# Patient Record
Sex: FEMALE | Race: WHITE | NOT HISPANIC OR LATINO
[De-identification: names, ages, dates, MRNs, and addresses within clinical notes are randomized per-mention and may not be internally consistent; named-entity substitution may affect disease eponyms.]

---

## 2020-06-17 ENCOUNTER — APPOINTMENT (OUTPATIENT)
Dept: RADIOLOGY | Facility: CLINIC | Age: 45
End: 2020-06-17

## 2020-06-17 ENCOUNTER — OUTPATIENT (OUTPATIENT)
Dept: OUTPATIENT SERVICES | Facility: HOSPITAL | Age: 45
LOS: 1 days | End: 2020-06-17
Payer: MEDICAID

## 2020-06-17 ENCOUNTER — RESULT REVIEW (OUTPATIENT)
Age: 45
End: 2020-06-17

## 2020-06-17 ENCOUNTER — APPOINTMENT (OUTPATIENT)
Dept: ORTHOPEDIC SURGERY | Facility: CLINIC | Age: 45
End: 2020-06-17
Payer: MEDICAID

## 2020-06-17 ENCOUNTER — APPOINTMENT (OUTPATIENT)
Dept: INTERNAL MEDICINE | Facility: CLINIC | Age: 45
End: 2020-06-17
Payer: MEDICAID

## 2020-06-17 VITALS — WEIGHT: 56 LBS | BODY MASS INDEX: 10.3 KG/M2 | HEIGHT: 62 IN

## 2020-06-17 VITALS
HEART RATE: 65 BPM | TEMPERATURE: 97.4 F | BODY MASS INDEX: 10.3 KG/M2 | HEIGHT: 62 IN | OXYGEN SATURATION: 100 % | DIASTOLIC BLOOD PRESSURE: 57 MMHG | WEIGHT: 56 LBS | SYSTOLIC BLOOD PRESSURE: 80 MMHG

## 2020-06-17 VITALS — BODY MASS INDEX: 10.3 KG/M2 | HEIGHT: 62 IN | WEIGHT: 56 LBS

## 2020-06-17 DIAGNOSIS — Z78.9 OTHER SPECIFIED HEALTH STATUS: ICD-10-CM

## 2020-06-17 DIAGNOSIS — Z80.8 FAMILY HISTORY OF MALIGNANT NEOPLASM OF OTHER ORGANS OR SYSTEMS: ICD-10-CM

## 2020-06-17 DIAGNOSIS — F32.89 OTHER SPECIFIED DEPRESSIVE EPISODES: ICD-10-CM

## 2020-06-17 PROCEDURE — 73630 X-RAY EXAM OF FOOT: CPT | Mod: 26,LT

## 2020-06-17 PROCEDURE — 99204 OFFICE O/P NEW MOD 45 MIN: CPT

## 2020-06-17 PROCEDURE — 73610 X-RAY EXAM OF ANKLE: CPT | Mod: 26,LT

## 2020-06-17 PROCEDURE — 99203 OFFICE O/P NEW LOW 30 MIN: CPT

## 2020-06-17 RX ORDER — CITALOPRAM HYDROBROMIDE 20 MG/1
20 TABLET, FILM COATED ORAL
Refills: 0 | Status: ACTIVE | COMMUNITY

## 2020-06-17 NOTE — REVIEW OF SYSTEMS
[Joint Pain] : joint pain [Depression] : depression [Negative] : Heme/Lymph [Chest Pain] : no chest pain [Fever] : no fever [Skin Rash] : no skin rash [Shortness Of Breath] : no shortness of breath [FreeTextEntry9] : left foot pain

## 2020-06-17 NOTE — PHYSICAL EXAM
[No Acute Distress] : no acute distress [Cachectic] : cachexia was observed [Normal Sclera/Conjunctiva] : normal sclera/conjunctiva [Normal Oropharynx] : the oropharynx was normal [No Lymphadenopathy] : no lymphadenopathy [Normal Rate] : normal rate  [Clear to Auscultation] : lungs were clear to auscultation bilaterally [Regular Rhythm] : with a regular rhythm [Pedal Pulses Present] : the pedal pulses are present [No Edema] : there was no peripheral edema [Soft] : abdomen soft [Non Tender] : non-tender [No CVA Tenderness] : no CVA  tenderness [No Rash] : no rash [Normal] : affect was normal and insight and judgment were intact [de-identified] : left foot - no tednerness/swelling/swelling/vesicles- pain in heel when pushes off with walking

## 2020-06-17 NOTE — HISTORY OF PRESENT ILLNESS
[FreeTextEntry8] : 1. Pt presents to establish Primary Care\par 2. foot pain\par \par c/o left lateral heel/ankle pain x 3 weeks \par continuous\par 9/10- getting worse \par pain only worse w walking when pushes off w foot - no pain w peddling bike- is a cyclist - cycles from [facility] to [facility] \par better w rest\par same sx right foot- pain resolved after 3 weeks wo tx \par no trauma\par no radiation \par no numbness/weakness\par no swelling/redness/vesicles\par no calf pain/swelling/redness/vesicle\par no h/o gout\par h/o anorexia-\par h/o severe osteoporosis- DEXA > 20 yrs ago -  no known stress fx - no medication \par OTC "pain medicine patches" name? \par \par \par

## 2020-06-18 ENCOUNTER — OUTPATIENT (OUTPATIENT)
Dept: OUTPATIENT SERVICES | Facility: HOSPITAL | Age: 45
LOS: 1 days | End: 2020-06-18

## 2020-06-18 ENCOUNTER — APPOINTMENT (OUTPATIENT)
Dept: RADIOLOGY | Facility: CLINIC | Age: 45
End: 2020-06-18
Payer: MEDICAID

## 2020-06-18 PROCEDURE — 77085 DXA BONE DENSITY AXL VRT FX: CPT | Mod: 26

## 2020-06-22 ENCOUNTER — LABORATORY RESULT (OUTPATIENT)
Age: 45
End: 2020-06-22

## 2020-06-22 ENCOUNTER — APPOINTMENT (OUTPATIENT)
Dept: RHEUMATOLOGY | Facility: CLINIC | Age: 45
End: 2020-06-22
Payer: MEDICAID

## 2020-06-22 VITALS
WEIGHT: 54.31 LBS | HEIGHT: 62 IN | OXYGEN SATURATION: 100 % | BODY MASS INDEX: 9.99 KG/M2 | HEART RATE: 60 BPM | SYSTOLIC BLOOD PRESSURE: 105 MMHG | DIASTOLIC BLOOD PRESSURE: 67 MMHG | TEMPERATURE: 98 F

## 2020-06-22 DIAGNOSIS — Z82.62 FAMILY HISTORY OF OSTEOPOROSIS: ICD-10-CM

## 2020-06-22 PROCEDURE — 99204 OFFICE O/P NEW MOD 45 MIN: CPT | Mod: 25

## 2020-06-22 PROCEDURE — 36415 COLL VENOUS BLD VENIPUNCTURE: CPT

## 2020-06-22 NOTE — DATA REVIEWED
[FreeTextEntry1] : \par  DEXA Bone Density Axial with Fracture Assessment             Final\par  EXAM:  XR BONE DENS AXIAL W VERT FX\par \par PROCEDURE DATE:  06/18/2020\par INTERPRETATION:\par BONE DENSITY STUDY OF THE LUMBAR SPINE AND LEFT HIP\par VERTEBRAL FRACTURE ANALYSIS\par \par Clinical History: 44 year old postmenopausal female.  Baseline examination.\par \par Procedure: Measurements of bone density were made in the lumbar spine and in the left hip using dual x-ray absorptiometry (DEXA).  Vertebral fracture analysis was also performed on a lateral image of the spine.\par \par Results:\par \par Left Hip (Total)\par \par BMD       0.596 g/sq. cm.\par T-Score  -2.8 SD from the mean\par \par \par Femoral neck\par BMD       0.503 g/sq. cm.\par T-Score  -3.1 SD from the mean\par \par \par FRAX\par WHO fracture risk assessment tool\par \par 10 year fracture risk\par Major osteoporotic fracture       5.2%\par Hip fracture                               2.3%\par \par \par Spine\par \par BMD       0.655 g/sq. cm.\par T-Score  -3.6 SD from the mean\par \par Vertebral fracture analysis shows no significant compression deformity in the visualized thoracolumbar spine.\par \par \par Impression:\par Bone mineral density in the range of osteoporosis.\par No significant compression deformity in the vertebral fracture analysis.\par \par \par Legend:\par BMD = Bone mineral density\par T-Score = Variance from a young adult population matched for gender and ethnicity\par Z-Score = Variance from a population matched for age, gender and ethnicity\par ([employer] [facility])\par \par \par \par \par \par \par \par \par ALINE TORRES M.D., ATTENDING RADIOLOGIST\par This document has been electronically signed. Jun 18 2020  3:27PM\par \par  \par \par  Ordered by: DANNIE LOUISE       Collected/Examined: 18Jun2020 10:22AM       \par Verified by: DANNIE LOUISE 22Jun2020 08:02AM       \par  Result Communication: No patient communication needed at this time;\par Stage: Final       \par  Performed at: [facility]       Resulted: 18Jun2020 03:31PM       Last Updated: 22Jun2020 08:02AM       Accession: [id number]       \par  XR FOOT COMPLETE 3 VIEWS LEFT             Final\par \par No Documents Attached\par \par \par \par \par   EXAM:  XR FOOT COMP MIN 3 VIEWS LT\par EXAM:  XR ANKLE COMP MIN 3 VIEWS LT\par \par \par \par  XR ANKLE COMPLETE 3 VIEWS LEFT             Final\par \par No Documents Attached\par \par \par \par \par   EXAM:  XR FOOT COMP MIN 3 VIEWS LT\par EXAM:  XR ANKLE COMP MIN 3 VIEWS LT\par \par PROCEDURE DATE:  06/17/2020\par \par \par \par \par INTERPRETATION:  XR ANKLE COMPLETE 3 VIEWS LEFT, XR FOOT COMPLETE 3 VIEWS LEFT dated 6/17/2020 10:12 AM\par \par INDICATION: Left foot and ankle pain for 2 1/2 weeks.\par \par TECHNIQUE: 3 views of the left ankle and 3 views of the left foot are submitted.\par \par COMPARISON: None.\par \par FINDINGS: No acute fracture or dislocation. Normal bone density. The ankle mortise is congruent. No soft tissue swelling. Minimal productive change at the midfoot. No evidence of tarsal coalition. 1st and fifth metatarsal bunion deformities are present.\par \par IMPRESSION:\par No acute injury.\par \par \par \par \par \par \par ALEX HUITRON M.D., RADIOLOGY RESIDENT\par This document has been electronically signed.\par LEXI GONZALEZ M.D., ATTENDING RADIOLOGIST\par This document has been electronically signed. Jun 17 2020  2:15PM\par \par  \par \par  Ordered by: DANNIE LOUISE       Collected/Examined: 17Jun2020 10:12AM       \par Verified by: DANNIE LOUISE 18Jun2020 01:43PM       \par  Result Communication: No patient communication needed at this time;\par Stage: Final       \par  Performed at: [facility]       Resulted: 17Jun2020 02:18PM       Last Updated: 18Jun2020 01:43PM       Accession: [id number]       \par PROCEDURE DATE:  06/17/2020\par \par \par \par \par INTERPRETATION:  XR ANKLE COMPLETE 3 VIEWS LEFT, XR FOOT COMPLETE 3 VIEWS LEFT dated 6/17/2020 10:12 AM\par \par INDICATION: Left foot and ankle pain for 2 1/2 weeks.\par \par TECHNIQUE: 3 views of the left ankle and 3 views of the left foot are submitted.\par \par COMPARISON: None.\par \par FINDINGS: No acute fracture or dislocation. Normal bone density. The ankle mortise is congruent. No soft tissue swelling. Minimal productive change at the midfoot. No evidence of tarsal coalition. 1st and fifth metatarsal bunion deformities are present.\par \par IMPRESSION:\par No acute injury.\par \par \par \par \par \par \par ALEX HUITRON M.D., RADIOLOGY RESIDENT\par This document has been electronically signed.\par LEXI GONZALEZ M.D., ATTENDING RADIOLOGIST\par This document has been electronically signed. Jun 17 2020  2:15PM\par \par  \par \par  Ordered by: DANNIE LOUISE       Collected/Examined: 17Jun2020 10:12AM       \par Verified by: DANNIE LOUISE 18Jun2020 01:43PM       \par  Result Communication: No patient communication needed at this time;\par Stage: Final       \par  Performed at: [facility]       Resulted: 17Jun2020 02:18PM       Last Updated: 18Jun2020 01:43PM       Accession: [id number]

## 2020-06-22 NOTE — PHYSICAL EXAM
[General Appearance - In No Acute Distress] : in no acute distress [General Appearance - Alert] : alert [Sclera] : the sclera and conjunctiva were normal [Respiration, Rhythm And Depth] : normal respiratory rhythm and effort [Veins - Varicosity Changes] : there were no varicosital changes [Edema] : there was no peripheral edema [Exaggerated Use Of Accessory Muscles For Inspiration] : no accessory muscle use [] : no rash [Oriented To Time, Place, And Person] : oriented to person, place, and time [Impaired Insight] : insight and judgment were intact [Affect] : the affect was normal [FreeTextEntry1] : thin hair

## 2020-06-22 NOTE — ASSESSMENT
[FreeTextEntry1] : 44 year old woman with history of osteoporosis referred for rheumatologic evaluation.  Patient with severe osteoporosis likely secondary to anorexia in combination with early menopause, with past history of rib fracture with strong family history of osteoporosis in her mother.  Discussed treatment options with patient.  Will obtain baseline blood tests today in addition to further evaluation of other possible secondary causes, such as vitamin deficiencies, hyperparathyroidism and thyroid disorders.  Patient will follow up in 1-2 weeks to review results and discuss further management options.
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
## 2020-06-23 LAB
25(OH)D3 SERPL-MCNC: 84.8 NG/ML
ALBUMIN SERPL ELPH-MCNC: 4.3 G/DL
ALP BLD-CCNC: 61 U/L
ALT SERPL-CCNC: 41 U/L
ANION GAP SERPL CALC-SCNC: 11 MMOL/L
AST SERPL-CCNC: 58 U/L
BASOPHILS # BLD AUTO: 0.05 K/UL
BASOPHILS NFR BLD AUTO: 1.8 %
BILIRUB SERPL-MCNC: 0.3 MG/DL
BUN SERPL-MCNC: 4 MG/DL
CALCIUM SERPL-MCNC: 9.8 MG/DL
CALCIUM SERPL-MCNC: 9.8 MG/DL
CHLORIDE SERPL-SCNC: 94 MMOL/L
CO2 SERPL-SCNC: 28 MMOL/L
CREAT SERPL-MCNC: 0.67 MG/DL
EOSINOPHIL # BLD AUTO: 0 K/UL
EOSINOPHIL NFR BLD AUTO: 0 %
FERRITIN SERPL-MCNC: 2852 NG/ML
FOLATE SERPL-MCNC: >20 NG/ML
GLUCOSE SERPL-MCNC: 68 MG/DL
HCT VFR BLD CALC: 34.9 %
HGB BLD-MCNC: 11.4 G/DL
IRON SATN MFR SERPL: NORMAL %
IRON SERPL-MCNC: 227 UG/DL
LYMPHOCYTES # BLD AUTO: 0.74 K/UL
LYMPHOCYTES NFR BLD AUTO: 25.4 %
MAGNESIUM SERPL-MCNC: 1.7 MG/DL
MAN DIFF?: NORMAL
MCHC RBC-ENTMCNC: 32.7 GM/DL
MCHC RBC-ENTMCNC: 32.8 PG
MCV RBC AUTO: 100.3 FL
MONOCYTES # BLD AUTO: 0.36 K/UL
MONOCYTES NFR BLD AUTO: 12.3 %
NEUTROPHILS # BLD AUTO: 1.75 K/UL
NEUTROPHILS NFR BLD AUTO: 60.5 %
PARATHYROID HORMONE INTACT: 15 PG/ML
PHOSPHATE SERPL-MCNC: 3.5 MG/DL
PLATELET # BLD AUTO: 268 K/UL
POTASSIUM SERPL-SCNC: 4.5 MMOL/L
PROT SERPL-MCNC: 5.8 G/DL
RBC # BLD: 3.48 M/UL
RBC # FLD: 12.5 %
RHEUMATOID FACT SER QL: <10 IU/ML
SODIUM SERPL-SCNC: 133 MMOL/L
TIBC SERPL-MCNC: NORMAL UG/DL
TSH SERPL-ACNC: 0.83 UIU/ML
UIBC SERPL-MCNC: <20 UG/DL
VIT B12 SERPL-MCNC: >2000 PG/ML
WBC # FLD AUTO: 2.9 K/UL

## 2020-06-25 LAB
CCP AB SER IA-ACNC: <8 UNITS
RF+CCP IGG SER-IMP: NEGATIVE

## 2020-06-26 LAB — VIT C SERPL-MCNC: 1.8 MG/DL

## 2020-07-01 ENCOUNTER — APPOINTMENT (OUTPATIENT)
Dept: ORTHOPEDIC SURGERY | Facility: CLINIC | Age: 45
End: 2020-07-01
Payer: MEDICAID

## 2020-07-01 VITALS — RESPIRATION RATE: 16 BRPM | HEIGHT: 62 IN | WEIGHT: 54 LBS | BODY MASS INDEX: 9.94 KG/M2

## 2020-07-01 DIAGNOSIS — M25.572 PAIN IN LEFT ANKLE AND JOINTS OF LEFT FOOT: ICD-10-CM

## 2020-07-01 PROCEDURE — 28400 CLTX CALCANEAL FX W/O MNPJ: CPT | Mod: LT

## 2020-07-01 PROCEDURE — 99214 OFFICE O/P EST MOD 30 MIN: CPT | Mod: 57

## 2020-07-06 ENCOUNTER — APPOINTMENT (OUTPATIENT)
Dept: RHEUMATOLOGY | Facility: CLINIC | Age: 45
End: 2020-07-06
Payer: MEDICAID

## 2020-07-06 VITALS
WEIGHT: 54.38 LBS | HEART RATE: 65 BPM | HEIGHT: 62 IN | TEMPERATURE: 98.6 F | DIASTOLIC BLOOD PRESSURE: 66 MMHG | SYSTOLIC BLOOD PRESSURE: 117 MMHG | BODY MASS INDEX: 10.01 KG/M2 | OXYGEN SATURATION: 99 %

## 2020-07-06 PROCEDURE — 99214 OFFICE O/P EST MOD 30 MIN: CPT
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
## 2020-07-06 NOTE — ASSESSMENT
[FreeTextEntry1] : 44 year old woman with history of osteoporosis referred for rheumatologic evaluation.  Patient with severe osteoporosis likely secondary to anorexia in combination with early menopause, with past history of rib fracture with strong family history of osteoporosis in her mother.  Discussed treatment options with patient.  Given in the process of dental implants, discussed anabolic agents such as forteo or tymlos.  PTH in the normal range.  Calcium in the normal range.  No history of skeletal radiation, no history of Paget's disease.  Reviewed side effects of forteo including but not limited to osteosarcoma, hyperparathyroidism, hypercalcemia, bone pain, headache, and injection site pain.  Will follow up in one month following initiation of medication.  Trial of possible six month course of anabolic agent followed by antiresorptive agent such as reclast or prolia if implants completed.  Follow up with orthopedist.  Will make appointment with RN for teaching when approved as well.  Leukopenia, patient requests repeat blood tests at next visit, patient defers repeat blood tests today.

## 2020-08-11 ENCOUNTER — APPOINTMENT (OUTPATIENT)
Dept: RHEUMATOLOGY | Facility: CLINIC | Age: 45
End: 2020-08-11
Payer: MEDICAID

## 2020-08-11 PROCEDURE — 99211 OFF/OP EST MAY X REQ PHY/QHP: CPT

## 2020-08-12 ENCOUNTER — APPOINTMENT (OUTPATIENT)
Dept: ORTHOPEDIC SURGERY | Facility: CLINIC | Age: 45
End: 2020-08-12
Payer: MEDICAID

## 2020-08-12 ENCOUNTER — OUTPATIENT (OUTPATIENT)
Dept: OUTPATIENT SERVICES | Facility: HOSPITAL | Age: 45
LOS: 1 days | End: 2020-08-12
Payer: COMMERCIAL

## 2020-08-12 ENCOUNTER — RESULT REVIEW (OUTPATIENT)
Age: 45
End: 2020-08-12

## 2020-08-12 VITALS — WEIGHT: 54 LBS | BODY MASS INDEX: 9.94 KG/M2 | RESPIRATION RATE: 16 BRPM | HEIGHT: 62 IN

## 2020-08-12 DIAGNOSIS — M84.375A STRESS FRACTURE, LEFT FOOT, INITIAL ENCOUNTER FOR FRACTURE: ICD-10-CM

## 2020-08-12 DIAGNOSIS — M79.672 PAIN IN LEFT FOOT: ICD-10-CM

## 2020-08-12 PROCEDURE — 73650 X-RAY EXAM OF HEEL: CPT

## 2020-08-12 PROCEDURE — 73650 X-RAY EXAM OF HEEL: CPT | Mod: 26,LT

## 2020-08-12 PROCEDURE — 99024 POSTOP FOLLOW-UP VISIT: CPT

## 2020-09-08 RX ORDER — TERIPARATIDE 250 UG/ML
620 INJECTION, SOLUTION SUBCUTANEOUS DAILY
Qty: 1 | Refills: 2 | Status: DISCONTINUED | COMMUNITY
Start: 2020-07-06 | End: 2020-09-08

## 2020-09-14 ENCOUNTER — APPOINTMENT (OUTPATIENT)
Dept: RHEUMATOLOGY | Facility: CLINIC | Age: 45
End: 2020-09-14

## 2021-10-10 ENCOUNTER — TRANSCRIPTION ENCOUNTER (OUTPATIENT)
Age: 46
End: 2021-10-10

## 2022-05-12 ENCOUNTER — APPOINTMENT (OUTPATIENT)
Dept: INTERNAL MEDICINE | Facility: CLINIC | Age: 47
End: 2022-05-12

## 2022-06-01 ENCOUNTER — NON-APPOINTMENT (OUTPATIENT)
Age: 47
End: 2022-06-01

## 2022-06-01 ENCOUNTER — APPOINTMENT (OUTPATIENT)
Dept: INTERNAL MEDICINE | Facility: CLINIC | Age: 47
End: 2022-06-01
Payer: MEDICAID

## 2022-06-01 ENCOUNTER — LABORATORY RESULT (OUTPATIENT)
Age: 47
End: 2022-06-01

## 2022-06-01 VITALS
HEIGHT: 63 IN | HEART RATE: 86 BPM | TEMPERATURE: 96.1 F | DIASTOLIC BLOOD PRESSURE: 75 MMHG | SYSTOLIC BLOOD PRESSURE: 106 MMHG | OXYGEN SATURATION: 98 % | WEIGHT: 54 LBS | BODY MASS INDEX: 9.57 KG/M2

## 2022-06-01 DIAGNOSIS — Z23 ENCOUNTER FOR IMMUNIZATION: ICD-10-CM

## 2022-06-01 PROCEDURE — 93000 ELECTROCARDIOGRAM COMPLETE: CPT

## 2022-06-01 PROCEDURE — 99214 OFFICE O/P EST MOD 30 MIN: CPT | Mod: 25

## 2022-06-01 RX ORDER — ABALOPARATIDE 2000 UG/ML
3120 INJECTION, SOLUTION SUBCUTANEOUS
Qty: 1.56 | Refills: 1 | Status: DISCONTINUED | COMMUNITY
Start: 2020-09-08 | End: 2022-06-01

## 2022-06-01 RX ORDER — CALCIUM CITRATE/VITAMIN D3 315MG-6.25
TABLET ORAL
Refills: 0 | Status: DISCONTINUED | COMMUNITY
End: 2022-06-01

## 2022-06-01 NOTE — PHYSICAL EXAM
[Cachectic] : cachexia was observed [Normal Sclera/Conjunctiva] : normal sclera/conjunctiva [No Lymphadenopathy] : no lymphadenopathy [Clear to Auscultation] : lungs were clear to auscultation bilaterally [Normal Rate] : normal rate  [Regular Rhythm] : with a regular rhythm [Soft] : abdomen soft [Non Tender] : non-tender [No CVA Tenderness] : no CVA  tenderness [No Rash] : no rash [Normal] : affect was normal and insight and judgment were intact [de-identified] : cachectic  [de-identified] : no calf tenderness/swelling/palpable cord [de-identified] : edema ankles

## 2022-06-01 NOTE — HISTORY OF PRESENT ILLNESS
[FreeTextEntry8] : c/o swelling feet x 8-10 weeks \par continuous \par worse end of day \par traveled to [place] 4/2022 - sx started before trip\par sleeps one pillow \par no new mediation/change dose \par no CP/pl CP/palpitations/dizziness/SOB - \par no calf pain/swelling \par no new mediation/change dose \par no change in diet \par no h/o CHF/heart murmur/MVP\par no h/o liver, kidney, Thyroid  disease \par no h/o DVT\par h/o anorexia- under are Psych \par OTC none\par

## 2022-06-02 LAB
ALBUMIN SERPL ELPH-MCNC: 4.1 G/DL
ALP BLD-CCNC: 87 U/L
ALT SERPL-CCNC: 22 U/L
ANION GAP SERPL CALC-SCNC: 14 MMOL/L
APPEARANCE: CLEAR
AST SERPL-CCNC: 36 U/L
BACTERIA: NEGATIVE
BASOPHILS # BLD AUTO: 0 K/UL
BASOPHILS NFR BLD AUTO: 0 %
BILIRUB SERPL-MCNC: 0.3 MG/DL
BILIRUBIN URINE: NEGATIVE
BLOOD URINE: NEGATIVE
BUN SERPL-MCNC: 11 MG/DL
CALCIUM SERPL-MCNC: 8.9 MG/DL
CHLORIDE SERPL-SCNC: 92 MMOL/L
CO2 SERPL-SCNC: 24 MMOL/L
COLOR: YELLOW
CREAT SERPL-MCNC: 0.61 MG/DL
EGFR: 110 ML/MIN/1.73M2
EOSINOPHIL # BLD AUTO: 0 K/UL
EOSINOPHIL NFR BLD AUTO: 0 %
ESTIMATED AVERAGE GLUCOSE: 88 MG/DL
GLUCOSE QUALITATIVE U: NEGATIVE
GLUCOSE SERPL-MCNC: 39 MG/DL
HBA1C MFR BLD HPLC: 4.7 %
HCT VFR BLD CALC: 33.3 %
HGB BLD-MCNC: 10.8 G/DL
HYALINE CASTS: 1 /LPF
KETONES URINE: NORMAL
LEUKOCYTE ESTERASE URINE: NEGATIVE
LYMPHOCYTES # BLD AUTO: 0.48 K/UL
LYMPHOCYTES NFR BLD AUTO: 16.2 %
MAN DIFF?: NORMAL
MCHC RBC-ENTMCNC: 31.6 PG
MCHC RBC-ENTMCNC: 32.4 GM/DL
MCV RBC AUTO: 97.4 FL
MICROSCOPIC-UA: NORMAL
MONOCYTES # BLD AUTO: 0.27 K/UL
MONOCYTES NFR BLD AUTO: 9 %
NEUTROPHILS # BLD AUTO: 2.18 K/UL
NEUTROPHILS NFR BLD AUTO: 73.9 %
NITRITE URINE: NEGATIVE
PH URINE: 6.5
PLATELET # BLD AUTO: 389 K/UL
POTASSIUM SERPL-SCNC: 4.3 MMOL/L
PROT SERPL-MCNC: 5.9 G/DL
PROTEIN URINE: NORMAL
RBC # BLD: 3.42 M/UL
RBC # FLD: 12.6 %
RED BLOOD CELLS URINE: 4 /HPF
SODIUM SERPL-SCNC: 130 MMOL/L
SPECIFIC GRAVITY URINE: 1.02
SQUAMOUS EPITHELIAL CELLS: 1 /HPF
TSH SERPL-ACNC: 1.15 UIU/ML
UROBILINOGEN URINE: NORMAL
WBC # FLD AUTO: 2.95 K/UL
WHITE BLOOD CELLS URINE: 1 /HPF

## 2022-06-03 LAB
FERRITIN SERPL-MCNC: 2725 NG/ML
IRON SATN MFR SERPL: 21 %
IRON SERPL-MCNC: 42 UG/DL
NT-PROBNP SERPL-MCNC: 1477 PG/ML
TIBC SERPL-MCNC: 199 UG/DL
UIBC SERPL-MCNC: 157 UG/DL

## 2022-06-06 ENCOUNTER — NON-APPOINTMENT (OUTPATIENT)
Age: 47
End: 2022-06-06

## 2022-06-14 ENCOUNTER — LABORATORY RESULT (OUTPATIENT)
Age: 47
End: 2022-06-14

## 2022-06-14 ENCOUNTER — APPOINTMENT (OUTPATIENT)
Dept: NEPHROLOGY | Facility: CLINIC | Age: 47
End: 2022-06-14
Payer: MEDICAID

## 2022-06-14 VITALS
SYSTOLIC BLOOD PRESSURE: 80 MMHG | WEIGHT: 56 LBS | RESPIRATION RATE: 100 BRPM | DIASTOLIC BLOOD PRESSURE: 65 MMHG | HEIGHT: 63 IN | BODY MASS INDEX: 9.92 KG/M2 | TEMPERATURE: 97.3 F | HEART RATE: 80 BPM

## 2022-06-14 DIAGNOSIS — R60.0 LOCALIZED EDEMA: ICD-10-CM
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
## 2022-06-14 PROCEDURE — 99205 OFFICE O/P NEW HI 60 MIN: CPT

## 2022-06-14 NOTE — CONSULT LETTER
[Dear  ___] : Dear  [unfilled], [Consult Letter:] : I had the pleasure of evaluating your patient, [unfilled]. [Please see my note below.] : Please see my note below. [Consult Closing:] : Thank you very much for allowing me to participate in the care of this patient.  If you have any questions, please do not hesitate to contact me. [FreeTextEntry1] : Her swelling and hyponatremia are quite interesting, and i hope to get to the bottom of it quickly. Interestingly she had a lesion on her liver and a small pericardial effusion, and so I have ordered a formal abdominal ultrasound and echocardiogram. Otherwise her broad workup is pending and hopefully will be easily treated.  [FreeTextEntry3] : Warm regards,\par Quinn Coto MD MA
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
## 2022-06-14 NOTE — ASSESSMENT
[FreeTextEntry1] : Patient is a 45 yo F with h/o anorexia, severe osteoporosis, hx of heel fractures who presents for LE swelling and hyponatremia.\par \par Hyponatremia - POCUS of kidneys normal size, somewhat increased echogenicity and dilated calyces consistent with mild chronic kidney disease. No stones or hydronephrosis, cysts, masses. However, incidentally seen liver lesions, increased echogenicity with central decreased echogenicity consistent with a mass, will obtain formal echo for assessment. POCUS of heart with thin walls and small pericardial effusion. \par - Echocardiogram\par - Broad workup as below, anorexia may have triggered nephrotic syndrome causing worsening LE edema and hyponatremia\par \par Liver lesion - formal abdominal ultrasound\par \par Fall at home - mechanical as per patient, will send lidocaine patch for rib pain, otherwise breathing comfortably\par \par Will call with results

## 2022-06-14 NOTE — PHYSICAL EXAM
[General Appearance - Alert] : alert [General Appearance - In No Acute Distress] : in no acute distress [Sclera] : the sclera and conjunctiva were normal [PERRL With Normal Accommodation] : pupils were equal in size, round, and reactive to light [Extraocular Movements] : extraocular movements were intact [Outer Ear] : the ears and nose were normal in appearance [Oropharynx] : the oropharynx was normal [Neck Appearance] : the appearance of the neck was normal [Respiration, Rhythm And Depth] : normal respiratory rhythm and effort [Exaggerated Use Of Accessory Muscles For Inspiration] : no accessory muscle use [Auscultation Breath Sounds / Voice Sounds] : lungs were clear to auscultation bilaterally [Heart Rate And Rhythm] : heart rate was normal and rhythm regular [Heart Sounds] : normal S1 and S2 [Heart Sounds Gallop] : no gallops [Murmurs] : no murmurs [Heart Sounds Pericardial Friction Rub] : no pericardial rub [Bowel Sounds] : normal bowel sounds [Abdomen Soft] : soft [Abdomen Tenderness] : non-tender [Abdomen Mass (___ Cm)] : no abdominal mass palpated [No CVA Tenderness] : no ~M costovertebral angle tenderness [No Spinal Tenderness] : no spinal tenderness [] : no rash [FreeTextEntry1] :  thinning hair, lanugo

## 2022-06-15 LAB
ALBUMIN SERPL ELPH-MCNC: 4.1 G/DL
ALP BLD-CCNC: 97 U/L
ALT SERPL-CCNC: 21 U/L
ANION GAP SERPL CALC-SCNC: 18 MMOL/L
APTT BLD: 36.2 SEC
AST SERPL-CCNC: 38 U/L
BASOPHILS # BLD AUTO: 0.02 K/UL
BASOPHILS NFR BLD AUTO: 0.5 %
BILIRUB SERPL-MCNC: 0.4 MG/DL
BUN SERPL-MCNC: 10 MG/DL
C3 SERPL-MCNC: 79 MG/DL
C4 SERPL-MCNC: 26 MG/DL
CALCIUM SERPL-MCNC: 9.2 MG/DL
CHLORIDE SERPL-SCNC: 95 MMOL/L
CO2 SERPL-SCNC: 22 MMOL/L
CREAT SERPL-MCNC: 0.63 MG/DL
CREAT SPEC-SCNC: 188 MG/DL
CREAT SPEC-SCNC: 188 MG/DL
CREAT/PROT UR: 0.2 RATIO
CYSTATIN C SERPL-MCNC: 0.92 MG/L
DEPRECATED KAPPA LC FREE/LAMBDA SER: 1.24 RATIO
EGFR: 109 ML/MIN/1.73M2
EOSINOPHIL # BLD AUTO: 0 K/UL
EOSINOPHIL NFR BLD AUTO: 0 %
GFR/BSA.PRED SERPLBLD CYS-BASED-ARV: 85 ML/MIN/1.73M2
GLUCOSE SERPL-MCNC: 48 MG/DL
HCT VFR BLD CALC: 32.5 %
HGB BLD-MCNC: 10.3 G/DL
IMM GRANULOCYTES NFR BLD AUTO: 0.5 %
INR PPP: 1.02 RATIO
KAPPA LC CSF-MCNC: 1.38 MG/DL
KAPPA LC SERPL-MCNC: 1.71 MG/DL
LYMPHOCYTES # BLD AUTO: 0.43 K/UL
LYMPHOCYTES NFR BLD AUTO: 10.7 %
MAN DIFF?: NORMAL
MCHC RBC-ENTMCNC: 31.7 GM/DL
MCHC RBC-ENTMCNC: 32.2 PG
MCV RBC AUTO: 101.6 FL
MICROALBUMIN 24H UR DL<=1MG/L-MCNC: 3.4 MG/DL
MICROALBUMIN/CREAT 24H UR-RTO: 18 MG/G
MONOCYTES # BLD AUTO: 0.26 K/UL
MONOCYTES NFR BLD AUTO: 6.5 %
NEUTROPHILS # BLD AUTO: 3.28 K/UL
NEUTROPHILS NFR BLD AUTO: 81.8 %
NT-PROBNP SERPL-MCNC: 1275 PG/ML
OSMOLALITY SERPL: 269 MOSMOL/KG
PLATELET # BLD AUTO: 446 K/UL
POTASSIUM SERPL-SCNC: 4.2 MMOL/L
PROT SERPL-MCNC: 5.5 G/DL
PROT UR-MCNC: 34 MG/DL
PT BLD: 11.8 SEC
RBC # BLD: 3.2 M/UL
RBC # FLD: 13 %
SODIUM SERPL-SCNC: 134 MMOL/L
URATE SERPL-MCNC: 3.2 MG/DL
WBC # FLD AUTO: 4.01 K/UL

## 2022-06-20 LAB
ALBUMIN MFR SERPL ELPH: 60.8 %
ALBUMIN SERPL-MCNC: 3.3 G/DL
ALBUMIN/GLOB SERPL: 1.5 RATIO
ALPHA1 GLOB MFR SERPL ELPH: 5.7 %
ALPHA1 GLOB SERPL ELPH-MCNC: 0.3 G/DL
ALPHA2 GLOB MFR SERPL ELPH: 15 %
ALPHA2 GLOB SERPL ELPH-MCNC: 0.8 G/DL
ANA SER IF-ACNC: NEGATIVE
APPEARANCE: CLEAR
B-GLOBULIN MFR SERPL ELPH: 9.4 %
B-GLOBULIN SERPL ELPH-MCNC: 0.5 G/DL
BACTERIA UR CULT: NORMAL
BACTERIA: NEGATIVE
BILIRUBIN URINE: ABNORMAL
BLOOD URINE: NEGATIVE
COLOR: ABNORMAL
DSDNA AB SER-ACNC: <12 IU/ML
FOLATE SERPL-MCNC: >20 NG/ML
GAMMA GLOB FLD ELPH-MCNC: 0.5 G/DL
GAMMA GLOB MFR SERPL ELPH: 9.1 %
GLUCOSE QUALITATIVE U: NEGATIVE
HYALINE CASTS: 0 /LPF
INTERPRETATION SERPL IEP-IMP: NORMAL
KETONES URINE: ABNORMAL
LEUKOCYTE ESTERASE URINE: NEGATIVE
M PROTEIN SPEC IFE-MCNC: NORMAL
MICROSCOPIC-UA: NORMAL
NITRITE URINE: NEGATIVE
OSMOLALITY UR: 482 MOSM/KG
PH URINE: 6
PHOSPHOLIPASE A2 RECEPTOR ELISA: <1.8 RU/ML
POTASSIUM UR-SCNC: 79.4 MMOL/L
PROT SERPL-MCNC: 5.5 G/DL
PROT SERPL-MCNC: 5.5 G/DL
PROTEIN URINE: NEGATIVE
PROTEINASE3 AB SER IA-ACNC: <5 UNITS
PROTEINASE3 AB SER-ACNC: NEGATIVE
RED BLOOD CELLS URINE: 10 /HPF
SODIUM ?TM SUB UR QN: 29 MMOL/L
SPECIFIC GRAVITY URINE: 1.02
SQUAMOUS EPITHELIAL CELLS: 5 /HPF
URATE UR-MCNC: 84.5 MG/DL
URINE COMMENTS: NORMAL
UROBILINOGEN URINE: NORMAL
UUN UR-MCNC: 601 MG/DL
VIT B1 SERPL-MCNC: 87.8 NMOL/L
VIT B12 SERPL-MCNC: >2000 PG/ML
WHITE BLOOD CELLS URINE: 3 /HPF

## 2022-06-22 ENCOUNTER — APPOINTMENT (OUTPATIENT)
Dept: HEART AND VASCULAR | Facility: CLINIC | Age: 47
End: 2022-06-22
Payer: MEDICAID

## 2022-06-22 VITALS
TEMPERATURE: 96.8 F | BODY MASS INDEX: 10.3 KG/M2 | HEART RATE: 50 BPM | SYSTOLIC BLOOD PRESSURE: 121 MMHG | HEIGHT: 62 IN | OXYGEN SATURATION: 100 % | WEIGHT: 56 LBS | DIASTOLIC BLOOD PRESSURE: 81 MMHG

## 2022-06-22 PROCEDURE — 99203 OFFICE O/P NEW LOW 30 MIN: CPT

## 2022-06-22 NOTE — DISCUSSION/SUMMARY
[FreeTextEntry1] : PE/edema echo and venous u/s pending. low suspicion for clinically significant pathology \par will have her come back for results and re-evaluation.

## 2022-06-22 NOTE — REASON FOR VISIT
[Symptom and Test Evaluation] : symptom and test evaluation [FreeTextEntry1] : 46 year old woman comes in for a visit. She was recently seen by Dr Coto and noted to have a pericardial effusion on POCUS. She occasionally gets short of breath. She remarks on edema in her legs as well. This is often noted with activity. Symptoms always improve at rest. Cardiac testing scheduled at [facility].

## 2022-06-28 ENCOUNTER — APPOINTMENT (OUTPATIENT)
Dept: ULTRASOUND IMAGING | Facility: CLINIC | Age: 47
End: 2022-06-28
Payer: MEDICAID

## 2022-06-28 ENCOUNTER — RESULT REVIEW (OUTPATIENT)
Age: 47
End: 2022-06-28

## 2022-06-28 ENCOUNTER — OUTPATIENT (OUTPATIENT)
Dept: OUTPATIENT SERVICES | Facility: HOSPITAL | Age: 47
LOS: 1 days | End: 2022-06-28

## 2022-06-28 PROCEDURE — 76857 US EXAM PELVIC LIMITED: CPT | Mod: 26

## 2022-06-28 PROCEDURE — 76700 US EXAM ABDOM COMPLETE: CPT | Mod: 26

## 2022-07-12 ENCOUNTER — APPOINTMENT (OUTPATIENT)
Dept: MRI IMAGING | Facility: CLINIC | Age: 47
End: 2022-07-12

## 2022-07-12 ENCOUNTER — RESULT REVIEW (OUTPATIENT)
Age: 47
End: 2022-07-12

## 2022-07-12 ENCOUNTER — OUTPATIENT (OUTPATIENT)
Dept: OUTPATIENT SERVICES | Facility: HOSPITAL | Age: 47
LOS: 1 days | End: 2022-07-12

## 2022-07-12 PROCEDURE — 74183 MRI ABD W/O CNTR FLWD CNTR: CPT | Mod: 26

## 2022-07-13 RX ORDER — AMINO ACIDS/PROTEIN HYDROLYS 15G-100/30
LIQUID (ML) ORAL
Qty: 1 | Refills: 0 | Status: ACTIVE | COMMUNITY
Start: 2022-07-13 | End: 1900-01-01

## 2022-07-19 ENCOUNTER — APPOINTMENT (OUTPATIENT)
Dept: HEMATOLOGY ONCOLOGY | Facility: CLINIC | Age: 47
End: 2022-07-19

## 2022-07-19 VITALS
BODY MASS INDEX: 10.15 KG/M2 | SYSTOLIC BLOOD PRESSURE: 119 MMHG | WEIGHT: 55.13 LBS | HEART RATE: 66 BPM | HEIGHT: 62 IN | DIASTOLIC BLOOD PRESSURE: 79 MMHG | OXYGEN SATURATION: 99 % | TEMPERATURE: 97.7 F

## 2022-07-19 DIAGNOSIS — D50.9 IRON DEFICIENCY ANEMIA, UNSPECIFIED: ICD-10-CM

## 2022-07-19 DIAGNOSIS — E83.19 OTHER DISORDERS OF IRON METABOLISM: ICD-10-CM

## 2022-07-19 PROCEDURE — 99203 OFFICE O/P NEW LOW 30 MIN: CPT | Mod: 25

## 2022-07-19 PROCEDURE — 36415 COLL VENOUS BLD VENIPUNCTURE: CPT

## 2022-07-19 RX ORDER — LIDOCAINE 40 MG/G
4 PATCH TOPICAL
Qty: 1 | Refills: 0 | Status: COMPLETED | COMMUNITY
Start: 2022-06-14 | End: 2022-07-19

## 2022-07-19 NOTE — ASSESSMENT
[FreeTextEntry1] : Discussed with patient the neutropenia as well as the iron deficiency anemia...\par \par At this point I would recommend for patient have a BM aspirate and biopsy, but she is not interested at present time...

## 2022-07-19 NOTE — HISTORY OF PRESENT ILLNESS
[de-identified] : 46 years old white female history of anorexia x35 years with a BMI of 10... Recently patient was found to have leukopenia as well as iron deficiency anemia.... She does not menstruate and did not have upper and lower endoscopy... Not interested in having upper and lower endoscopy... Would like to get protein supplements but they were not approved by patient's insurance...

## 2022-07-20 ENCOUNTER — APPOINTMENT (OUTPATIENT)
Dept: NEPHROLOGY | Facility: CLINIC | Age: 47
End: 2022-07-20

## 2022-07-20 VITALS
HEART RATE: 86 BPM | TEMPERATURE: 98 F | WEIGHT: 56 LBS | SYSTOLIC BLOOD PRESSURE: 102 MMHG | BODY MASS INDEX: 10.3 KG/M2 | HEIGHT: 62 IN | OXYGEN SATURATION: 96 % | DIASTOLIC BLOOD PRESSURE: 71 MMHG

## 2022-07-20 DIAGNOSIS — D64.9 ANEMIA, UNSPECIFIED: ICD-10-CM

## 2022-07-20 DIAGNOSIS — R79.89 OTHER SPECIFIED ABNORMAL FINDINGS OF BLOOD CHEMISTRY: ICD-10-CM

## 2022-07-20 PROCEDURE — 99215 OFFICE O/P EST HI 40 MIN: CPT

## 2022-07-21 ENCOUNTER — OUTPATIENT (OUTPATIENT)
Dept: OUTPATIENT SERVICES | Facility: HOSPITAL | Age: 47
LOS: 1 days | End: 2022-07-21
Payer: COMMERCIAL

## 2022-07-21 DIAGNOSIS — I31.3 PERICARDIAL EFFUSION (NONINFLAMMATORY): ICD-10-CM

## 2022-07-21 PROBLEM — R79.89 HIGH SERUM FERRITIN: Status: ACTIVE | Noted: 2022-06-14

## 2022-07-21 PROBLEM — D64.9 ANEMIA: Status: ACTIVE | Noted: 2022-06-14

## 2022-07-21 PROCEDURE — 76376 3D RENDER W/INTRP POSTPROCES: CPT | Mod: 26

## 2022-07-21 PROCEDURE — 93306 TTE W/DOPPLER COMPLETE: CPT

## 2022-07-21 PROCEDURE — 93306 TTE W/DOPPLER COMPLETE: CPT | Mod: 26

## 2022-07-21 NOTE — ASSESSMENT
[FreeTextEntry1] : Patient is a 47 yo F with h/o anorexia, severe osteoporosis, hx of heel fractures who presents for LE swelling and hyponatremia.\par \par Hyponatremia - will need increased protein intake, given poor intake overall attempting to get prostat through insurance. Will have repeat labs 2-3 weeks after starting\par \par Small Pericardial effusion - Echocardiogram pending\par \par Liver lesion - MRI with Hemangioma and hemachromatosis\par \par Hemachromatosis - monitoring for now\par \par Fall at home - Improved pain on rib side\par \par RTC 2-3 weeks after starting prostate

## 2022-07-21 NOTE — HISTORY OF PRESENT ILLNESS
[FreeTextEntry1] : Patient is a 45 yo F with h/o anorexia, severe osteoporosis, hx of heel fractures who presents for LE swelling and hyponatremia.\par \par Patient has been undergoing workup as documented, some concern for hemochromatosis but cannot tolerate blood letting given anemia, and chelation therapy may be too invasive. Monitoring for now. Otherwise feels well, no particular issues. LE swelling comes and goes. SOB and weakness at patients baseline. Issues with insurance and protein discussed, will attempt to reconcile myself.

## 2022-07-22 DIAGNOSIS — E83.119 HEMOCHROMATOSIS, UNSPECIFIED: ICD-10-CM

## 2022-07-22 DIAGNOSIS — E87.1 HYPO-OSMOLALITY AND HYPONATREMIA: ICD-10-CM

## 2022-07-22 DIAGNOSIS — R16.0 HEPATOMEGALY, NOT ELSEWHERE CLASSIFIED: ICD-10-CM

## 2022-07-25 ENCOUNTER — APPOINTMENT (OUTPATIENT)
Dept: HEART AND VASCULAR | Facility: CLINIC | Age: 47
End: 2022-07-25

## 2022-07-25 VITALS
WEIGHT: 55.5 LBS | TEMPERATURE: 98 F | SYSTOLIC BLOOD PRESSURE: 111 MMHG | HEIGHT: 62 IN | OXYGEN SATURATION: 89 % | HEART RATE: 67 BPM | DIASTOLIC BLOOD PRESSURE: 73 MMHG | BODY MASS INDEX: 10.21 KG/M2

## 2022-07-25 DIAGNOSIS — I31.3 PERICARDIAL EFFUSION (NONINFLAMMATORY): ICD-10-CM

## 2022-07-25 PROCEDURE — 99213 OFFICE O/P EST LOW 20 MIN: CPT

## 2022-07-25 NOTE — DISCUSSION/SUMMARY
[FreeTextEntry1] : CP/PE echo reviewed. Inclined towards a conservative follow up in this patient. We had a careful discussion regarding diet and exercise. Will be happy to re-evaluate.\par Not a good stress test candidate; careful clinical follow up advised.

## 2022-07-25 NOTE — REASON FOR VISIT
[Symptom and Test Evaluation] : symptom and test evaluation [FreeTextEntry1] : 46 year old woman comes in for a visit. She was recently seen by Dr Coto and noted to have a pericardial effusion on POCUS. She occasionally gets short of breath. She remarks on edema in her legs as well. This is often noted with activity. Symptoms always improve at rest. s/p echo we are discussing results.

## 2022-07-28 ENCOUNTER — APPOINTMENT (OUTPATIENT)
Dept: INTERNAL MEDICINE | Facility: CLINIC | Age: 47
End: 2022-07-28

## 2022-07-28 VITALS
BODY MASS INDEX: 10.12 KG/M2 | DIASTOLIC BLOOD PRESSURE: 71 MMHG | TEMPERATURE: 97.3 F | WEIGHT: 55 LBS | HEART RATE: 59 BPM | OXYGEN SATURATION: 100 % | SYSTOLIC BLOOD PRESSURE: 109 MMHG | HEIGHT: 62 IN

## 2022-07-28 DIAGNOSIS — M81.0 AGE-RELATED OSTEOPOROSIS W/OUT CURRENT PATHOLOGICAL FRACTURE: ICD-10-CM

## 2022-07-28 DIAGNOSIS — Z00.00 ENCOUNTER FOR GENERAL ADULT MEDICAL EXAMINATION W/OUT ABNORMAL FINDINGS: ICD-10-CM

## 2022-07-28 DIAGNOSIS — D70.9 NEUTROPENIA, UNSPECIFIED: ICD-10-CM

## 2022-07-28 DIAGNOSIS — D64.9 ANEMIA, UNSPECIFIED: ICD-10-CM

## 2022-07-28 PROCEDURE — G0439: CPT

## 2022-07-28 PROCEDURE — 99213 OFFICE O/P EST LOW 20 MIN: CPT | Mod: 25

## 2022-07-28 NOTE — PHYSICAL EXAM
[No Acute Distress] : no acute distress [Normal Sclera/Conjunctiva] : normal sclera/conjunctiva [Clear to Auscultation] : lungs were clear to auscultation bilaterally [Normal Rate] : normal rate  [Regular Rhythm] : with a regular rhythm [Declined Breast Exam] : declined breast exam  [Soft] : abdomen soft [Non Tender] : non-tender [No CVA Tenderness] : no CVA  tenderness [No Rash] : no rash [Normal] : affect was normal and insight and judgment were intact [de-identified] : cachectic  [de-identified] : +1 pedal edema

## 2022-07-28 NOTE — HISTORY OF PRESENT ILLNESS
[FreeTextEntry1] : 1. Patient presents today for Annual Physical\par 2. chronic cough \par 3. needs letter for second COVID booster  [de-identified] : c/o cough x 3 months\par a/w hoarseness on/off- lasting 2- 3 weeks at a time\par does not interfere w sleep \par non smoker\par no post nasal drip, nasal congestion \par no SOB/CP/palpitations\par no fever/night sweats \par no h/o COVID - home COVID test negative 5/2022 \par no h/o CHF, GERD, asthma, seasonal allergies, TB or positive PPD \par h/o anorexia\par h/o hemochromatosis/iron overload, chronic hyponatremia, chronic pedal edema, low Hb/WBC, osteoporosis\par OTC Tussin wo relief \par work

## 2022-07-28 NOTE — HEALTH RISK ASSESSMENT
[Never] : Never [No] : In the past 12 months have you used drugs other than those required for medical reasons? No [No falls in past year] : Patient reported no falls in the past year [0] : 2) Feeling down, depressed, or hopeless: Not at all (0) [PHQ-2 Negative - No further assessment needed] : PHQ-2 Negative - No further assessment needed [Patient declined mammogram] : Patient declined mammogram [Patient declined PAP Smear] : Patient declined PAP Smear [Patient declined bone density test] : Patient declined bone density test [Patient declined colonoscopy] : Patient declined colonoscopy [HIV test declined] : HIV test declined [Hepatitis C test declined] : Hepatitis C test declined [Alone] : lives alone [Employed] : employed [Fully functional (bathing, dressing, toileting, transferring, walking, feeding)] : Fully functional (bathing, dressing, toileting, transferring, walking, feeding) [Smoke Detector] : smoke detector [Carbon Monoxide Detector] : carbon monoxide detector [Seat Belt] :  uses seat belt [With Patient/Caregiver] : , with patient/caregiver [Name: ___] : Health Care Proxy's Name: [unfilled]  [Relationship: ___] : Relationship: [unfilled] [I will adhere to the patient's wishes.] : I will adhere to the patient's wishes. [Time Spent: ___ minutes] : Time Spent: [unfilled] minutes [[id number]] : 0 [EyeExamDate] : declined [Sexually Active] : not sexually active [Reports changes in hearing] : Reports no changes in hearing [Reports changes in dental health] : Reports no changes in dental health [Guns at Home] : no guns at home [Sunscreen] : does not use sunscreen [BoneDensityDate] : 6/1/2020 [BoneDensityComments] : osteoporosis  [de-identified] :   [AdvancecareDate] : 7/28/2022

## 2022-08-08 LAB — TM INTERPRETATION: NORMAL

## 2022-08-09 ENCOUNTER — NON-APPOINTMENT (OUTPATIENT)
Age: 47
End: 2022-08-09

## 2022-08-09 ENCOUNTER — OUTPATIENT (OUTPATIENT)
Dept: OUTPATIENT SERVICES | Facility: HOSPITAL | Age: 47
LOS: 1 days | End: 2022-08-09

## 2022-08-09 ENCOUNTER — RESULT REVIEW (OUTPATIENT)
Age: 47
End: 2022-08-09

## 2022-08-09 ENCOUNTER — TRANSCRIPTION ENCOUNTER (OUTPATIENT)
Age: 47
End: 2022-08-09

## 2022-08-09 ENCOUNTER — APPOINTMENT (OUTPATIENT)
Dept: RADIOLOGY | Facility: CLINIC | Age: 47
End: 2022-08-09

## 2022-08-09 PROCEDURE — 71046 X-RAY EXAM CHEST 2 VIEWS: CPT | Mod: 26

## 2022-08-15 ENCOUNTER — APPOINTMENT (OUTPATIENT)
Dept: PULMONOLOGY | Facility: CLINIC | Age: 47
End: 2022-08-15

## 2022-08-15 VITALS
HEIGHT: 62 IN | HEART RATE: 58 BPM | WEIGHT: 55 LBS | OXYGEN SATURATION: 100 % | TEMPERATURE: 97.6 F | BODY MASS INDEX: 10.12 KG/M2 | SYSTOLIC BLOOD PRESSURE: 110 MMHG | DIASTOLIC BLOOD PRESSURE: 75 MMHG

## 2022-08-15 DIAGNOSIS — R79.89 OTHER SPECIFIED ABNORMAL FINDINGS OF BLOOD CHEMISTRY: ICD-10-CM

## 2022-08-15 DIAGNOSIS — Z87.09 PERSONAL HISTORY OF OTHER DISEASES OF THE RESPIRATORY SYSTEM: ICD-10-CM

## 2022-08-15 PROCEDURE — 99204 OFFICE O/P NEW MOD 45 MIN: CPT

## 2022-08-15 RX ORDER — FLUTICASONE PROPIONATE 50 UG/1
50 SPRAY, METERED NASAL TWICE DAILY
Qty: 1 | Refills: 5 | Status: ACTIVE | COMMUNITY
Start: 2022-08-15 | End: 1900-01-01

## 2022-08-15 NOTE — HISTORY OF PRESENT ILLNESS
[Never] : never [TextBox_4] : 47 year old female, never smoker referred to clinic by Dr. Robertson for cough for last 4 months. She continued to have dry cough, which is persistent without any improvement. Cough is present through out the day, predominantly dry, worse during night time. . Patient was seen by PCP in past and CXR was ordered. CXR was clear. He denies fever, chest pain or sore throat. No exposure at home, work identified. No pets a home. No h/o previous episodes of cough. \par  [ESS] : 0

## 2022-08-15 NOTE — CONSULT LETTER
[Dear  ___] : Dear  [unfilled], [Consult Letter:] : I had the pleasure of evaluating your patient, [unfilled]. [Please see my note below.] : Please see my note below. [Consult Closing:] : Thank you very much for allowing me to participate in the care of this patient.  If you have any questions, please do not hesitate to contact me. [FreeTextEntry3] : Sincerely\par \par Landon Hopkins MD [facility]\par , [facility] [employer]\par Associate , Pulmonary and Critical Care Fellowship\par Pulmonary and Critical Care\par [facility]\par Phone: [phone]\par

## 2022-08-15 NOTE — DISCUSSION/SUMMARY
[FreeTextEntry1] : 47 year old female, never smoker referred to clinic by Dr. Robertson for cough for last 3 months.\par \par Review:\par PCP note\par Labs (6/22): No eosinophilia\par CXr (8/22): NAD\par \par A/P\par Cough is secondary to allergic rhinitis leading to post nasal drip and possible silent GERD. \par - Flonase nasal spray twice daily\par - Famotidine 20 mg before dinner\par - PFt during next visit if cough does not resolve\par - Follow up after 4 weeks\par

## 2022-08-15 NOTE — PHYSICAL EXAM
[No Acute Distress] : no acute distress [Well Nourished] : well nourished [Normal Oropharynx] : normal oropharynx [Erythema] : erythema [Nasal congestion] : nasal congestion [II] : Mallampati Class: II [Normal Appearance] : normal appearance [Normal Rate/Rhythm] : normal rate/rhythm [Normal S1, S2] : normal s1, s2 [No Resp Distress] : no resp distress [Clear to Auscultation Bilaterally] : clear to auscultation bilaterally [Benign] : benign [Not Tender] : not tender [Normal Gait] : normal gait [No Clubbing] : no clubbing [No Edema] : no edema [Normal Color/ Pigmentation] : normal color/ pigmentation [No Rash] : no rash [No Focal Deficits] : no focal deficits [No Sensory Deficits] : no sensory deficits [Oriented x3] : oriented x3 [Normal Affect] : normal affect

## 2022-08-15 NOTE — REVIEW OF SYSTEMS
[Cough] : cough [Fever] : no fever [Chills] : no chills [Dry Eyes] : no dry eyes [Eye Irritation] : no eye irritation [Dyspnea] : no dyspnea [SOB on Exertion] : no sob on exertion [Chest Discomfort] : no chest discomfort [Orthopnea] : no orthopnea

## 2022-09-20 ENCOUNTER — LABORATORY RESULT (OUTPATIENT)
Age: 47
End: 2022-09-20

## 2022-09-22 ENCOUNTER — APPOINTMENT (OUTPATIENT)
Dept: PULMONOLOGY | Facility: CLINIC | Age: 47
End: 2022-09-22

## 2022-09-22 VITALS
SYSTOLIC BLOOD PRESSURE: 98 MMHG | BODY MASS INDEX: 10.12 KG/M2 | DIASTOLIC BLOOD PRESSURE: 65 MMHG | WEIGHT: 55 LBS | HEART RATE: 83 BPM | HEIGHT: 62 IN | OXYGEN SATURATION: 100 % | TEMPERATURE: 97.4 F

## 2022-09-22 PROCEDURE — 94726 PLETHYSMOGRAPHY LUNG VOLUMES: CPT

## 2022-09-22 PROCEDURE — 94060 EVALUATION OF WHEEZING: CPT

## 2022-09-22 PROCEDURE — 94618 PULMONARY STRESS TESTING: CPT

## 2022-09-22 PROCEDURE — 94729 DIFFUSING CAPACITY: CPT

## 2022-09-22 PROCEDURE — 99214 OFFICE O/P EST MOD 30 MIN: CPT | Mod: 25

## 2022-09-22 RX ORDER — BENZONATATE 100 MG/1
100 CAPSULE ORAL 3 TIMES DAILY
Qty: 90 | Refills: 1 | Status: ACTIVE | COMMUNITY
Start: 2022-09-22 | End: 1900-01-01

## 2022-09-22 RX ORDER — AZELASTINE HYDROCHLORIDE 137 UG/1
137 SPRAY, METERED NASAL
Qty: 1 | Refills: 5 | Status: ACTIVE | COMMUNITY
Start: 2022-09-22 | End: 1900-01-01

## 2022-09-22 RX ORDER — FAMOTIDINE 20 MG/1
20 TABLET, FILM COATED ORAL
Qty: 45 | Refills: 0 | Status: DISCONTINUED | COMMUNITY
Start: 2022-08-15 | End: 2022-09-22

## 2022-09-23 NOTE — REVIEW OF SYSTEMS
[Fever] : no fever [Chills] : no chills [Dry Eyes] : no dry eyes [Eye Irritation] : no eye irritation [Cough] : cough [Dyspnea] : no dyspnea [SOB on Exertion] : no sob on exertion [Chest Discomfort] : no chest discomfort [Orthopnea] : no orthopnea

## 2022-09-23 NOTE — DISCUSSION/SUMMARY
[FreeTextEntry1] : 47 year old female, never smoker referred to clinic by Dr. Robertson for cough for last 3 months.\par \par Review:\par PCP note\par Labs (6/22): No eosinophilia\par CXr (8/22): NAD\par PFT (9 /22 ): FEV1 54 , FEV1/FVC 91, TLC 96, DLCO 43, Rev 7%\par Oxygen saturation 100% with exertion on RA\par \par A/P\par Cough is secondary to allergic rhinitis leading to post nasal drip. Patient was treated with Flonase and famotidine without improvement.\par - Flonase nasal spray twice daily to continue. Add Azelastine nasal spray twice daily.\par - Famotidine 20 mg before dinner to be d/c\par - PFt does not show reversibility. Unlikely to have asthma as etiology for cough. Trial of prn albuterol for now. \par - Patient wants referral to ENT for throat evaluation. Referral done\par - Consider CT chest to r/o bronchiectasis. \par - Follow up after ENT visit. \par

## 2022-09-23 NOTE — HISTORY OF PRESENT ILLNESS
[Never] : never [TextBox_4] : 47 year old female, never smoker referred to clinic by Dr. Robertson for cough for last 4 months. She continued to have dry cough, which is persistent without any improvement. Cough is present through out the day, predominantly dry, worse during night time. . Patient was seen by PCP in past and CXR was ordered. CXR was clear. He denies fever, chest pain or sore throat. No exposure at home, work identified. No pets a home. No h/o previous episodes of cough. \par \par 9/22/22:\par Persistent cough. No improvement. Worsening nasal congestion. Denies GERD or SOB. Cough continues to be dry. PFt was done. \par  [ESS] : 0

## 2022-09-23 NOTE — CONSULT LETTER
[Dear  ___] : Dear  [unfilled], [Consult Letter:] : I had the pleasure of evaluating your patient, [unfilled]. [Please see my note below.] : Please see my note below. [Consult Closing:] : Thank you very much for allowing me to participate in the care of this patient.  If you have any questions, please do not hesitate to contact me. [FreeTextEntry3] : Sincerely\par \par Landon Hopkins MD [facility]\par , [facility] [employer]\par Associate , Pulmonary and Critical Care Fellowship\par Pulmonary and Critical Care\par [facility]\par Phone: [phone]\par

## 2022-09-26 RX ORDER — ALBUTEROL SULFATE 90 UG/1
108 (90 BASE) INHALANT RESPIRATORY (INHALATION)
Qty: 1 | Refills: 5 | Status: ACTIVE | COMMUNITY
Start: 2022-09-22 | End: 1900-01-01

## 2022-09-29 ENCOUNTER — APPOINTMENT (OUTPATIENT)
Dept: OTOLARYNGOLOGY | Facility: CLINIC | Age: 47
End: 2022-09-29

## 2022-09-29 VITALS
TEMPERATURE: 97.6 F | BODY MASS INDEX: 10.12 KG/M2 | DIASTOLIC BLOOD PRESSURE: 77 MMHG | HEIGHT: 62 IN | OXYGEN SATURATION: 99 % | HEART RATE: 62 BPM | WEIGHT: 55 LBS | SYSTOLIC BLOOD PRESSURE: 116 MMHG

## 2022-09-29 DIAGNOSIS — R05.3 CHRONIC COUGH: ICD-10-CM

## 2022-09-29 DIAGNOSIS — K21.9 GASTRO-ESOPHAGEAL REFLUX DISEASE W/OUT ESOPHAGITIS: ICD-10-CM

## 2022-09-29 DIAGNOSIS — J30.9 ALLERGIC RHINITIS, UNSPECIFIED: ICD-10-CM

## 2022-09-29 PROCEDURE — 99203 OFFICE O/P NEW LOW 30 MIN: CPT | Mod: 25

## 2022-09-29 PROCEDURE — 31231 NASAL ENDOSCOPY DX: CPT

## 2022-09-29 RX ORDER — FAMOTIDINE 20 MG/1
20 TABLET, FILM COATED ORAL
Qty: 90 | Refills: 1 | Status: ACTIVE | COMMUNITY
Start: 2022-09-29 | End: 1900-01-01

## 2022-09-29 NOTE — PROCEDURE
[FreeTextEntry6] : Indication: requirement for exam not possible via anterior rhinoscopy; r/o nasal drip\par After verbal consent and the administration of an aerosolized phenylephrine/lidocaine mix, examination was performed with a flexible endoscope attached to a video monitoring system. Findings:\par Septum: mild NSD to the R\par Mucosa: normal\par Polyposis: not present\par Inferior turbinates: unremarkable\par Middle and superior turbinates: normal\par Inferior meatus: unremarkable\par Middle meatus: unremarkable\par Superior meatus: unremarkable\par Speno-ethmoidal recess: unremarkable\par Nasopharynx: unremarkable\par Secretions: unremarkable\par Other findings: none [de-identified] : Indication: requirement for exam not possible via anterior examination; cough\par After verbal consent and the administration of an aerosolized phenylephrine/lidocaine mix, examination was performed with a flexible endoscope placed through the nose which was attached to a video monitoring system. Findings:\par Nasopharynx: unremarkable\par Soft palate, lateral and posterior pharyngeal walls: unremarkable\par Base of tongue & lingual tonsil: minimal retrodisplacement\par Vallecula: unremarkable\par Epiglottis: unremarkable\par Piriform sinuses and pharyngoesophageal junction: unremarkable\par Arytenoids and AE folds: mild interarytenoid edema\par Ventricle and false vocal folds: unremarkable\par True vocal folds: Smooth free edge; surface without ectasias or edema; normal movement bilaterally with good apposition in phonation\par Visible subglottis: unremarkable\par Narrow-band imaging: not used\par Other findings: minimal ELM

## 2022-09-29 NOTE — ASSESSMENT
[FreeTextEntry1] : We discussed a lack of compelling signs/sxs to indicate an ENT source for her cough; however, I cannot tell her that reflux is not playing some role especially given her description of more attacks when supine. \par \par Reviewed reflux precautions and provided the patient with the corresponding educational handout. Restart famotidine qhs\par

## 2022-09-29 NOTE — HISTORY OF PRESENT ILLNESS
[de-identified] : Chronic dry cough and periodic aphonia; these episodes used to be preceded by URI sxs but then starting 4/22 she developed "attacks" of coughing with periods of aphonia. She was given famotidine (20 mg before dinner for 6 weeks) and fluticasone which didn't change anything. Albuterol does seem to help a little. \par She denies runny nose, globus, dysphagia, allergy sxs, sinus infections, nasal dyspnea or heartburn. \par Chronic anorexia.

## 2022-09-29 NOTE — PHYSICAL EXAM
[Nasal Endoscopy Performed] : nasal endoscopy was performed, see procedure section for findings [] : septum deviated to the right [Laryngoscopy Performed] : laryngoscopy was performed, see procedure section for findings [Normal] : assessment of respiratory effort is normal

## 2022-10-27 ENCOUNTER — APPOINTMENT (OUTPATIENT)
Dept: PULMONOLOGY | Facility: CLINIC | Age: 47
End: 2022-10-27

## 2022-11-09 ENCOUNTER — NON-APPOINTMENT (OUTPATIENT)
Age: 47
End: 2022-11-09

## 2022-11-09 DIAGNOSIS — R63.0 ANOREXIA: ICD-10-CM

## 2022-12-12 ENCOUNTER — NON-APPOINTMENT (OUTPATIENT)
Age: 47
End: 2022-12-12